# Patient Record
Sex: MALE | Race: WHITE | NOT HISPANIC OR LATINO | Employment: OTHER | RURAL
[De-identification: names, ages, dates, MRNs, and addresses within clinical notes are randomized per-mention and may not be internally consistent; named-entity substitution may affect disease eponyms.]

---

## 2017-01-03 ENCOUNTER — HOSPITAL ENCOUNTER (OUTPATIENT)
Dept: PHYSICAL THERAPY | Facility: HOSPITAL | Age: 42
Setting detail: RECURRING SERIES
Discharge: HOME | End: 2017-04-26
Attending: ORTHOPAEDIC SURGERY | Admitting: ORTHOPAEDIC SURGERY

## 2017-01-04 ENCOUNTER — CONVERSION ENCOUNTER (OUTPATIENT)
Dept: PAIN MEDICINE | Facility: CLINIC | Age: 42
End: 2017-01-04

## 2017-01-04 ENCOUNTER — HOSPITAL ENCOUNTER (OUTPATIENT)
Dept: ORTHOPEDIC SURGERY | Facility: CLINIC | Age: 42
Discharge: HOME | End: 2017-01-04
Attending: ORTHOPAEDIC SURGERY | Admitting: ORTHOPAEDIC SURGERY

## 2017-01-09 ENCOUNTER — CONVERSION ENCOUNTER (OUTPATIENT)
Dept: PAIN MEDICINE | Facility: CLINIC | Age: 42
End: 2017-01-09

## 2017-01-09 ENCOUNTER — HOSPITAL ENCOUNTER (OUTPATIENT)
Dept: PAIN MEDICINE | Facility: HOSPITAL | Age: 42
Discharge: HOME | End: 2017-01-09
Attending: ANESTHESIOLOGY | Admitting: ANESTHESIOLOGY

## 2017-01-11 ENCOUNTER — HOSPITAL ENCOUNTER (OUTPATIENT)
Dept: MRI IMAGING | Facility: HOSPITAL | Age: 42
Discharge: HOME | End: 2017-01-11
Attending: ANESTHESIOLOGY | Admitting: ANESTHESIOLOGY

## 2017-01-30 ENCOUNTER — HOSPITAL ENCOUNTER (OUTPATIENT)
Dept: PAIN MEDICINE | Facility: HOSPITAL | Age: 42
Discharge: HOME | End: 2017-01-30
Attending: ANESTHESIOLOGY | Admitting: ANESTHESIOLOGY

## 2017-01-30 ENCOUNTER — CONVERSION ENCOUNTER (OUTPATIENT)
Dept: PAIN MEDICINE | Facility: CLINIC | Age: 42
End: 2017-01-30

## 2017-02-14 ENCOUNTER — HOSPITAL ENCOUNTER (OUTPATIENT)
Dept: PAIN MEDICINE | Facility: HOSPITAL | Age: 42
Discharge: HOME | End: 2017-02-14
Attending: ANESTHESIOLOGY | Admitting: ANESTHESIOLOGY

## 2017-02-14 ENCOUNTER — CONVERSION ENCOUNTER (OUTPATIENT)
Dept: PAIN MEDICINE | Facility: CLINIC | Age: 42
End: 2017-02-14

## 2017-02-28 ENCOUNTER — CONVERSION ENCOUNTER (OUTPATIENT)
Dept: PAIN MEDICINE | Facility: CLINIC | Age: 42
End: 2017-02-28

## 2017-02-28 ENCOUNTER — HOSPITAL ENCOUNTER (OUTPATIENT)
Dept: PAIN MEDICINE | Facility: HOSPITAL | Age: 42
Discharge: HOME | End: 2017-02-28
Attending: ANESTHESIOLOGY | Admitting: ANESTHESIOLOGY

## 2017-05-09 ENCOUNTER — HOSPITAL ENCOUNTER (OUTPATIENT)
Dept: PAIN MEDICINE | Facility: HOSPITAL | Age: 42
Discharge: HOME | End: 2017-05-09
Attending: ANESTHESIOLOGY | Admitting: ANESTHESIOLOGY

## 2017-05-09 ENCOUNTER — CONVERSION ENCOUNTER (OUTPATIENT)
Dept: PAIN MEDICINE | Facility: CLINIC | Age: 42
End: 2017-05-09

## 2017-05-23 ENCOUNTER — CONVERSION ENCOUNTER (OUTPATIENT)
Dept: PAIN MEDICINE | Facility: CLINIC | Age: 42
End: 2017-05-23

## 2017-05-23 ENCOUNTER — HOSPITAL ENCOUNTER (OUTPATIENT)
Dept: PAIN MEDICINE | Facility: HOSPITAL | Age: 42
Discharge: HOME | End: 2017-05-23
Attending: ANESTHESIOLOGY | Admitting: ANESTHESIOLOGY

## 2017-06-13 ENCOUNTER — HOSPITAL ENCOUNTER (OUTPATIENT)
Dept: PAIN MEDICINE | Facility: HOSPITAL | Age: 42
Discharge: HOME | End: 2017-06-13
Attending: ANESTHESIOLOGY | Admitting: ANESTHESIOLOGY

## 2017-06-13 ENCOUNTER — CONVERSION ENCOUNTER (OUTPATIENT)
Dept: PAIN MEDICINE | Facility: CLINIC | Age: 42
End: 2017-06-13

## 2017-07-11 ENCOUNTER — HOSPITAL ENCOUNTER (OUTPATIENT)
Dept: PAIN MEDICINE | Facility: HOSPITAL | Age: 42
Discharge: HOME | End: 2017-07-11
Attending: ANESTHESIOLOGY | Admitting: ANESTHESIOLOGY

## 2017-07-11 ENCOUNTER — CONVERSION ENCOUNTER (OUTPATIENT)
Dept: PAIN MEDICINE | Facility: CLINIC | Age: 42
End: 2017-07-11

## 2017-07-17 ENCOUNTER — HOSPITAL ENCOUNTER (OUTPATIENT)
Dept: PHYSICAL THERAPY | Facility: HOSPITAL | Age: 42
Setting detail: RECURRING SERIES
Discharge: HOME | End: 2017-09-01
Attending: ANESTHESIOLOGY | Admitting: ANESTHESIOLOGY

## 2019-06-03 ENCOUNTER — CONVERSION ENCOUNTER (OUTPATIENT)
Dept: SURGERY | Facility: CLINIC | Age: 44
End: 2019-06-03

## 2019-06-04 VITALS
DIASTOLIC BLOOD PRESSURE: 78 MMHG | WEIGHT: 161 LBS | HEART RATE: 87 BPM | DIASTOLIC BLOOD PRESSURE: 86 MMHG | SYSTOLIC BLOOD PRESSURE: 129 MMHG | OXYGEN SATURATION: 98 % | OXYGEN SATURATION: 99 % | HEART RATE: 82 BPM | WEIGHT: 173 LBS | HEART RATE: 97 BPM | DIASTOLIC BLOOD PRESSURE: 86 MMHG | HEART RATE: 85 BPM | SYSTOLIC BLOOD PRESSURE: 121 MMHG | SYSTOLIC BLOOD PRESSURE: 127 MMHG | RESPIRATION RATE: 16 BRPM | WEIGHT: 173 LBS | OXYGEN SATURATION: 98 % | WEIGHT: 173 LBS | HEART RATE: 74 BPM | DIASTOLIC BLOOD PRESSURE: 84 MMHG | WEIGHT: 173 LBS | RESPIRATION RATE: 16 BRPM | SYSTOLIC BLOOD PRESSURE: 133 MMHG | OXYGEN SATURATION: 99 % | DIASTOLIC BLOOD PRESSURE: 77 MMHG | RESPIRATION RATE: 16 BRPM | DIASTOLIC BLOOD PRESSURE: 82 MMHG | RESPIRATION RATE: 16 BRPM | RESPIRATION RATE: 16 BRPM | RESPIRATION RATE: 16 BRPM | HEART RATE: 74 BPM | WEIGHT: 161 LBS | OXYGEN SATURATION: 96 % | HEART RATE: 127 BPM | HEART RATE: 81 BPM | RESPIRATION RATE: 16 BRPM | SYSTOLIC BLOOD PRESSURE: 136 MMHG | OXYGEN SATURATION: 98 % | RESPIRATION RATE: 16 BRPM | WEIGHT: 173 LBS | OXYGEN SATURATION: 100 % | DIASTOLIC BLOOD PRESSURE: 83 MMHG | SYSTOLIC BLOOD PRESSURE: 129 MMHG | WEIGHT: 173 LBS | SYSTOLIC BLOOD PRESSURE: 137 MMHG | DIASTOLIC BLOOD PRESSURE: 91 MMHG | SYSTOLIC BLOOD PRESSURE: 130 MMHG | OXYGEN SATURATION: 96 %

## 2019-06-04 VITALS
OXYGEN SATURATION: 99 % | HEIGHT: 66 IN | DIASTOLIC BLOOD PRESSURE: 92 MMHG | WEIGHT: 150.2 LBS | SYSTOLIC BLOOD PRESSURE: 151 MMHG | BODY MASS INDEX: 24.14 KG/M2 | HEART RATE: 88 BPM

## 2019-06-06 ENCOUNTER — HOSPITAL ENCOUNTER (OUTPATIENT)
Dept: CT IMAGING | Facility: HOSPITAL | Age: 44
Discharge: HOME | End: 2019-06-06
Attending: SURGERY | Admitting: SURGERY
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
## 2019-06-24 ENCOUNTER — OFFICE VISIT (OUTPATIENT)
Dept: SURGERY | Facility: CLINIC | Age: 44
End: 2019-06-24

## 2019-06-24 VITALS
BODY MASS INDEX: 23.53 KG/M2 | DIASTOLIC BLOOD PRESSURE: 77 MMHG | HEART RATE: 88 BPM | TEMPERATURE: 97.6 F | SYSTOLIC BLOOD PRESSURE: 129 MMHG | HEIGHT: 66 IN | OXYGEN SATURATION: 98 % | WEIGHT: 146.4 LBS

## 2019-06-24 DIAGNOSIS — K59.00 CONSTIPATION, UNSPECIFIED CONSTIPATION TYPE: Primary | ICD-10-CM

## 2019-06-24 DIAGNOSIS — R10.32 INGUINODYNIA, LEFT: ICD-10-CM

## 2019-06-24 PROBLEM — M54.12 CERVICAL RADICULOPATHY: Status: ACTIVE | Noted: 2019-06-24

## 2019-06-24 PROBLEM — M54.2 NECK PAIN: Status: ACTIVE | Noted: 2019-06-24

## 2019-06-24 PROBLEM — M54.50 LOW BACK PAIN SYNDROME: Status: ACTIVE | Noted: 2019-06-24

## 2019-06-24 PROBLEM — G43.909 MIGRAINE: Status: ACTIVE | Noted: 2019-06-03

## 2019-06-24 PROBLEM — K40.91 RECURRENT LEFT INGUINAL HERNIA: Status: ACTIVE | Noted: 2019-06-03

## 2019-06-24 PROBLEM — IMO0002 THORACIC OR LUMBOSACRAL NEURITIS OR RADICULITIS: Status: ACTIVE | Noted: 2019-06-24

## 2019-06-24 PROBLEM — M54.16 LUMBAR RADICULOPATHY: Status: ACTIVE | Noted: 2019-06-24

## 2019-06-24 PROCEDURE — 99213 OFFICE O/P EST LOW 20 MIN: CPT | Performed by: SURGERY

## 2019-06-24 RX ORDER — NAPROXEN 250 MG/1
TABLET ORAL
COMMUNITY
Start: 2016-01-13 | End: 2022-10-28

## 2019-06-24 RX ORDER — POLYETHYLENE GLYCOL 3350 17 G/17G
17 POWDER, FOR SOLUTION ORAL DAILY PRN
Qty: 30 PACKET | Refills: 0 | Status: SHIPPED | OUTPATIENT
Start: 2019-06-24 | End: 2022-10-28

## 2019-06-24 RX ORDER — BACLOFEN 10 MG/1
TABLET ORAL
Refills: 2 | COMMUNITY
Start: 2019-05-13 | End: 2022-10-28

## 2019-06-24 RX ORDER — GABAPENTIN 800 MG/1
800 TABLET ORAL 4 TIMES DAILY
Refills: 6 | COMMUNITY
Start: 2019-06-08

## 2019-06-24 RX ORDER — DIPHENHYDRAMINE HCL 25 MG
1 CAPSULE ORAL EVERY 24 HOURS
COMMUNITY
Start: 2019-06-03

## 2019-06-24 RX ORDER — BUPRENORPHINE HYDROCHLORIDE AND NALOXONE HYDROCHLORIDE DIHYDRATE 8; 2 MG/1; MG/1
TABLET SUBLINGUAL
Refills: 0 | COMMUNITY
Start: 2019-06-06 | End: 2022-10-28

## 2019-06-24 NOTE — PROGRESS NOTES
GENERAL SURGERY PROGRESS NOTE    6/24/2019  Patient Care Team:  Belen Patterson MD as PCP - General  Belen Patterson MD as PCP - Family Medicine    Subjective     Interval History:   Patient returns to my office today with worsening left inguinal pain.  He reports that the pain is constant, and is made worse with direct pressure.  He describes a burning and shooting pain which radiates down the front of the left leg and radiates to the base of the penis on the left side.  He also has shooting pains in the left hemiscrotum as well.  He reports that he has had difficulty urinating, most specifically initiating a stream.  Once he is able to initiate a stream, he has no difficulties urinating.  He also states that he has been constipated, and was taking Colace and Dulcolax along with an enema, but had minimal relief.    Objective     Vital Signs  Temp:  [97.6 °F (36.4 °C)] 97.6 °F (36.4 °C)  Heart Rate:  [88] 88  BP: (129)/(77) 129/77    Physical Exam  General: awake alert and oriented, no distress  HEENT: NCAT, EOMI, MMM  Lungs: CTAB  Heart: RRR  Abd: soft, NT, ND  : bilateral groins with surgical incisions well healed. Tussive impulse on lateral aspect of left groin incision. No inguinal hernia. +tenderness tgo palpation bilaterally     Results Review:    Lab Results (last 24 hours)     ** No results found for the last 24 hours. **        Imaging Results (last 24 hours)     ** No results found for the last 24 hours. **         CT scan of the pelvis performed on 6/6/2019  Impression:  1.  No evidence of recurrent hernia.  There is minor postoperative change in each inguinal area  2.  There are 2 mainly fat density oval structures one in the left lower pelvis and one in the right perirectal area.  These could be due to lipomas or fat necrosis of unknown etiology.  Right perirectal lesion does have some wispy soft tissue components.  Consider CT scan follow-up in 6 months to check for stability.  3.  Evidence of  prior lumbar surgery    I have reviewed the above results and noted them below    Medication Review:    Current Outpatient Medications:   •  Aspirin-Caffeine (NAHUM BACK & BODY) 500-32.5 MG tablet, NAHUM BACK & BODY 500-32.5 MG TABS, Disp: , Rfl:   •  baclofen (LIORESAL) 10 MG tablet, TAKE 1 TABLET BY MOUTH THREE TIMES DAILY AS NEEDED FOR SPASM, Disp: , Rfl: 2  •  buprenorphine-naloxone (SUBOXONE) 8-2 MG per SL tablet, DISSOLVE 2 TABLETS UNDER THE TONGUE DAILY FOR 27 DAYS, Disp: , Rfl: 0  •  diphenhydrAMINE (BENADRYL ALLERGY) 25 mg capsule, 1 capsule Daily., Disp: , Rfl:   •  gabapentin (NEURONTIN) 800 MG tablet, Take 800 mg by mouth 4 (Four) Times a Day., Disp: , Rfl: 6  •  MULTIPLE VITAMIN-FOLIC ACID PO, MULTIVITAMINS TABS, Disp: , Rfl:   •  naproxen (NAPROSYN) 250 MG tablet, NAPROXEN TABS, Disp: , Rfl:   •  polyethylene glycol (MIRALAX) packet, Take 17 g by mouth Daily As Needed (constipation)., Disp: 30 packet, Rfl: 0    Assessment/Plan     Active Problems:  Left inguinodynia, constipation      With regards to the patient's constipation: I recommend drinking between 64 and 100 ounces of water per day, also recommend MiraLAX once or twice daily as needed for constipation    With regards the patient's left inguinodynia: I recommend ibuprofen 800 mg 3 times a day in conjunction with ice and/or heat for discomfort.  On CT scan, the patient does not have an evident recurrent inguinal hernia.  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  He is already taking Neurontin, I would recommend the patient follow-up with pain management and be evaluated for possibly undergoing injections around the ileal inguinal nerve to assist with his pain.  I discussed that I do not see a clear cause for his left inguinal pain which can be fixed with surgery.  If his left inguinodynia is intractable, he may need sectioning of the nerve, and/or removal of the mesh, but I would only do this if he continues to fail  nonoperative management.    Plan for disposition: Follow-up with me in 3 to 6 months if inguinal pain is not improved.    Efrain Gomez MD  06/24/19  10:53 AM

## 2019-12-19 ENCOUNTER — HOSPITAL ENCOUNTER (EMERGENCY)
Facility: HOSPITAL | Age: 44
Discharge: HOME | End: 2019-12-20
Attending: EMERGENCY MEDICINE | Admitting: EMERGENCY MEDICINE

## 2019-12-19 DIAGNOSIS — S13.9XXA ACUTE SPRAIN OF LIGAMENT OF NECK, INITIAL ENCOUNTER: Primary | ICD-10-CM

## 2019-12-19 DIAGNOSIS — M54.2 NECK PAIN: ICD-10-CM
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
## 2019-12-19 DIAGNOSIS — M47.899 OTHER OSTEOARTHRITIS OF SPINE, UNSPECIFIED SPINAL REGION: ICD-10-CM

## 2019-12-19 DIAGNOSIS — S33.5XXA LUMBAR SPRAIN, INITIAL ENCOUNTER: ICD-10-CM

## 2019-12-19 DIAGNOSIS — M54.12 CERVICAL RADICULOPATHY: ICD-10-CM

## 2019-12-19 DIAGNOSIS — M54.16 LUMBAR RADICULOPATHY: ICD-10-CM

## 2019-12-19 PROCEDURE — 99284 EMERGENCY DEPT VISIT MOD MDM: CPT

## 2019-12-20 ENCOUNTER — APPOINTMENT (OUTPATIENT)
Dept: CT IMAGING | Facility: HOSPITAL | Age: 44
End: 2019-12-20

## 2019-12-20 VITALS
SYSTOLIC BLOOD PRESSURE: 106 MMHG | DIASTOLIC BLOOD PRESSURE: 83 MMHG | HEART RATE: 84 BPM | RESPIRATION RATE: 18 BRPM | TEMPERATURE: 98.3 F | WEIGHT: 145 LBS | BODY MASS INDEX: 22.76 KG/M2 | OXYGEN SATURATION: 100 % | HEIGHT: 67 IN

## 2019-12-20 PROCEDURE — 72131 CT LUMBAR SPINE W/O DYE: CPT

## 2019-12-20 PROCEDURE — 72125 CT NECK SPINE W/O DYE: CPT

## 2019-12-20 PROCEDURE — 25010000002 KETOROLAC TROMETHAMINE PER 15 MG: Performed by: EMERGENCY MEDICINE

## 2019-12-20 PROCEDURE — 96372 THER/PROPH/DIAG INJ SC/IM: CPT

## 2019-12-20 PROCEDURE — 70450 CT HEAD/BRAIN W/O DYE: CPT

## 2019-12-20 RX ORDER — KETOROLAC TROMETHAMINE 30 MG/ML
60 INJECTION, SOLUTION INTRAMUSCULAR; INTRAVENOUS ONCE
Status: COMPLETED | OUTPATIENT
Start: 2019-12-20 | End: 2019-12-20

## 2019-12-20 RX ORDER — KETOROLAC TROMETHAMINE 30 MG/ML
60 INJECTION, SOLUTION INTRAMUSCULAR; INTRAVENOUS ONCE
Status: DISCONTINUED | OUTPATIENT
Start: 2019-12-20 | End: 2019-12-20

## 2019-12-20 RX ORDER — TRAMADOL HYDROCHLORIDE 50 MG/1
50 TABLET ORAL EVERY 6 HOURS PRN
Qty: 12 TABLET | Refills: 0 | Status: SHIPPED | OUTPATIENT
Start: 2019-12-20 | End: 2022-10-28

## 2019-12-20 RX ADMIN — KETOROLAC TROMETHAMINE 60 MG: 30 INJECTION, SOLUTION INTRAMUSCULAR at 00:23

## 2019-12-20 NOTE — ED NOTES
Pt was cleared to be moved off back board and c-collar removed. Advised pt again that a urine sample is needed.      Cris Sinclair RN  12/20/19 0127

## 2019-12-20 NOTE — DISCHARGE INSTRUCTIONS
Continue your baclofen and gabapentin  Rest next 24 hours  No heavy lifting bending stooping or pulling for the next 3 days  Follow-up with your primary care provider
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
## 2021-08-23 ENCOUNTER — TRANSCRIBE ORDERS (OUTPATIENT)
Dept: ADMINISTRATIVE | Facility: HOSPITAL | Age: 46
End: 2021-08-23

## 2021-08-23 ENCOUNTER — LAB (OUTPATIENT)
Dept: LAB | Facility: HOSPITAL | Age: 46
End: 2021-08-23

## 2021-08-23 DIAGNOSIS — F11.20 OPIOID TYPE DEPENDENCE, CONTINUOUS (HCC): Primary | ICD-10-CM

## 2021-08-23 DIAGNOSIS — Z01.89 LABORATORY TEST: ICD-10-CM

## 2021-08-23 DIAGNOSIS — F11.20 LONG-TERM CURRENT USE OF METHADONE FOR OPIATE DEPENDENCE (HCC): ICD-10-CM

## 2021-08-23 DIAGNOSIS — F11.20 OPIOID TYPE DEPENDENCE, CONTINUOUS (HCC): ICD-10-CM

## 2021-08-23 LAB
ALBUMIN SERPL-MCNC: 3.7 G/DL (ref 3.5–5.2)
ALBUMIN/GLOB SERPL: 0.9 G/DL
ALP SERPL-CCNC: 95 U/L (ref 39–117)
ALT SERPL W P-5'-P-CCNC: 93 U/L (ref 1–41)
ANION GAP SERPL CALCULATED.3IONS-SCNC: 10.6 MMOL/L (ref 5–15)
AST SERPL-CCNC: 52 U/L (ref 1–40)
BASOPHILS # BLD AUTO: 0.08 10*3/MM3 (ref 0–0.2)
BASOPHILS NFR BLD AUTO: 0.9 % (ref 0–1.5)
BILIRUB SERPL-MCNC: 0.2 MG/DL (ref 0–1.2)
BUN SERPL-MCNC: 14 MG/DL (ref 6–20)
BUN/CREAT SERPL: 14.3 (ref 7–25)
CALCIUM SPEC-SCNC: 9.2 MG/DL (ref 8.6–10.5)
CHLORIDE SERPL-SCNC: 103 MMOL/L (ref 98–107)
CO2 SERPL-SCNC: 27.4 MMOL/L (ref 22–29)
CREAT SERPL-MCNC: 0.98 MG/DL (ref 0.76–1.27)
DEPRECATED RDW RBC AUTO: 43.9 FL (ref 37–54)
EOSINOPHIL # BLD AUTO: 0.15 10*3/MM3 (ref 0–0.4)
EOSINOPHIL NFR BLD AUTO: 1.6 % (ref 0.3–6.2)
ERYTHROCYTE [DISTWIDTH] IN BLOOD BY AUTOMATED COUNT: 12.4 % (ref 12.3–15.4)
GFR SERPL CREATININE-BSD FRML MDRD: 82 ML/MIN/1.73
GLOBULIN UR ELPH-MCNC: 3.9 GM/DL
GLUCOSE SERPL-MCNC: 91 MG/DL (ref 65–99)
HAV IGM SERPL QL IA: ABNORMAL
HBV CORE IGM SERPL QL IA: ABNORMAL
HBV SURFACE AG SERPL QL IA: ABNORMAL
HCT VFR BLD AUTO: 40.8 % (ref 37.5–51)
HCV AB SER DONR QL: REACTIVE
HGB BLD-MCNC: 13.4 G/DL (ref 13–17.7)
HIV1+2 AB SER QL: NORMAL
IMM GRANULOCYTES # BLD AUTO: 0.06 10*3/MM3 (ref 0–0.05)
IMM GRANULOCYTES NFR BLD AUTO: 0.6 % (ref 0–0.5)
LYMPHOCYTES # BLD AUTO: 2.18 10*3/MM3 (ref 0.7–3.1)
LYMPHOCYTES NFR BLD AUTO: 23.5 % (ref 19.6–45.3)
MCH RBC QN AUTO: 31.8 PG (ref 26.6–33)
MCHC RBC AUTO-ENTMCNC: 32.8 G/DL (ref 31.5–35.7)
MCV RBC AUTO: 96.9 FL (ref 79–97)
MONOCYTES # BLD AUTO: 1.02 10*3/MM3 (ref 0.1–0.9)
MONOCYTES NFR BLD AUTO: 11 % (ref 5–12)
NEUTROPHILS NFR BLD AUTO: 5.78 10*3/MM3 (ref 1.7–7)
NEUTROPHILS NFR BLD AUTO: 62.4 % (ref 42.7–76)
NRBC BLD AUTO-RTO: 0.1 /100 WBC (ref 0–0.2)
PLATELET # BLD AUTO: 272 10*3/MM3 (ref 140–450)
PMV BLD AUTO: 10 FL (ref 6–12)
POTASSIUM SERPL-SCNC: 3.9 MMOL/L (ref 3.5–5.2)
PROT SERPL-MCNC: 7.6 G/DL (ref 6–8.5)
RBC # BLD AUTO: 4.21 10*6/MM3 (ref 4.14–5.8)
SODIUM SERPL-SCNC: 141 MMOL/L (ref 136–145)
WBC # BLD AUTO: 9.27 10*3/MM3 (ref 3.4–10.8)

## 2021-08-23 PROCEDURE — 85025 COMPLETE CBC W/AUTO DIFF WBC: CPT

## 2021-08-23 PROCEDURE — 80074 ACUTE HEPATITIS PANEL: CPT

## 2021-08-23 PROCEDURE — G0432 EIA HIV-1/HIV-2 SCREEN: HCPCS

## 2021-08-23 PROCEDURE — 80053 COMPREHEN METABOLIC PANEL: CPT

## 2021-08-23 PROCEDURE — 36415 COLL VENOUS BLD VENIPUNCTURE: CPT

## 2022-03-21 ENCOUNTER — OFFICE (OUTPATIENT)
Dept: URBAN - METROPOLITAN AREA CLINIC 64 | Facility: CLINIC | Age: 47
End: 2022-03-21

## 2022-03-21 VITALS
HEART RATE: 89 BPM | HEIGHT: 66 IN | SYSTOLIC BLOOD PRESSURE: 160 MMHG | DIASTOLIC BLOOD PRESSURE: 91 MMHG | WEIGHT: 178 LBS

## 2022-03-21 DIAGNOSIS — B18.2 CHRONIC VIRAL HEPATITIS C: ICD-10-CM

## 2022-03-21 PROCEDURE — 99204 OFFICE O/P NEW MOD 45 MIN: CPT | Performed by: INTERNAL MEDICINE

## 2022-07-12 ENCOUNTER — APPOINTMENT (OUTPATIENT)
Dept: GENERAL RADIOLOGY | Facility: HOSPITAL | Age: 47
End: 2022-07-12

## 2022-07-12 ENCOUNTER — HOSPITAL ENCOUNTER (EMERGENCY)
Facility: HOSPITAL | Age: 47
Discharge: HOME | End: 2022-07-13
Attending: EMERGENCY MEDICINE | Admitting: EMERGENCY MEDICINE

## 2022-07-12 ENCOUNTER — APPOINTMENT (OUTPATIENT)
Dept: CT IMAGING | Facility: HOSPITAL | Age: 47
End: 2022-07-12

## 2022-07-12 VITALS
BODY MASS INDEX: 22.76 KG/M2 | HEIGHT: 67 IN | TEMPERATURE: 99.9 F | WEIGHT: 145 LBS | HEART RATE: 110 BPM | RESPIRATION RATE: 15 BRPM | DIASTOLIC BLOOD PRESSURE: 97 MMHG | OXYGEN SATURATION: 97 % | SYSTOLIC BLOOD PRESSURE: 145 MMHG

## 2022-07-12 DIAGNOSIS — R50.9 HYPERTHERMIA: ICD-10-CM

## 2022-07-12 DIAGNOSIS — F15.10 METHAMPHETAMINE ABUSE: Primary | ICD-10-CM

## 2022-07-12 LAB
ALBUMIN SERPL-MCNC: 4.7 G/DL (ref 3.5–5.2)
ALBUMIN/GLOB SERPL: 1.2 G/DL
ALP SERPL-CCNC: 128 U/L (ref 39–117)
ALT SERPL W P-5'-P-CCNC: 97 U/L (ref 1–41)
AMPHET+METHAMPHET UR QL: POSITIVE
ANION GAP SERPL CALCULATED.3IONS-SCNC: 12 MMOL/L (ref 5–15)
APTT PPP: 26.5 SECONDS (ref 61–76.5)
ARTERIAL PATENCY WRIST A: ABNORMAL
AST SERPL-CCNC: 66 U/L (ref 1–40)
ATMOSPHERIC PRESS: ABNORMAL MM[HG]
BARBITURATES UR QL SCN: POSITIVE
BASE EXCESS BLDA CALC-SCNC: -3.5 MMOL/L (ref 0–3)
BASOPHILS # BLD AUTO: 0.1 10*3/MM3 (ref 0–0.2)
BASOPHILS NFR BLD AUTO: 1 % (ref 0–1.5)
BDY SITE: ABNORMAL
BENZODIAZ UR QL SCN: NEGATIVE
BILIRUB SERPL-MCNC: 0.3 MG/DL (ref 0–1.2)
BUN SERPL-MCNC: 15 MG/DL (ref 6–20)
BUN/CREAT SERPL: 10.6 (ref 7–25)
CALCIUM SPEC-SCNC: 9.6 MG/DL (ref 8.6–10.5)
CANNABINOIDS SERPL QL: NEGATIVE
CHLORIDE SERPL-SCNC: 102 MMOL/L (ref 98–107)
CK SERPL-CCNC: 67 U/L (ref 20–200)
CO2 BLDA-SCNC: 26.1 MMOL/L (ref 22–29)
CO2 SERPL-SCNC: 26 MMOL/L (ref 22–29)
COCAINE UR QL: NEGATIVE
CREAT SERPL-MCNC: 1.42 MG/DL (ref 0.76–1.27)
D DIMER PPP FEU-MCNC: 0.53 MG/L (FEU) (ref 0–0.59)
DEPRECATED RDW RBC AUTO: 47.7 FL (ref 37–54)
EGFRCR SERPLBLD CKD-EPI 2021: 61 ML/MIN/1.73
EOSINOPHIL # BLD AUTO: 0 10*3/MM3 (ref 0–0.4)
EOSINOPHIL NFR BLD AUTO: 0.4 % (ref 0.3–6.2)
ERYTHROCYTE [DISTWIDTH] IN BLOOD BY AUTOMATED COUNT: 14.4 % (ref 12.3–15.4)
ETHANOL UR QL: <0.01 %
GLOBULIN UR ELPH-MCNC: 4 GM/DL
GLUCOSE BLDC GLUCOMTR-MCNC: 161 MG/DL (ref 70–105)
GLUCOSE SERPL-MCNC: 145 MG/DL (ref 65–99)
HCO3 BLDA-SCNC: 24.4 MMOL/L (ref 21–28)
HCT VFR BLD AUTO: 44.9 % (ref 37.5–51)
HEMODILUTION: NO
HGB BLD-MCNC: 14.6 G/DL (ref 13–17.7)
HOLD SPECIMEN: NORMAL
HOLD SPECIMEN: NORMAL
INHALED O2 CONCENTRATION: 21 %
INR PPP: 1.07 (ref 0.93–1.1)
LYMPHOCYTES # BLD AUTO: 2.3 10*3/MM3 (ref 0.7–3.1)
LYMPHOCYTES NFR BLD AUTO: 22.5 % (ref 19.6–45.3)
MAGNESIUM SERPL-MCNC: 2.5 MG/DL (ref 1.6–2.6)
MCH RBC QN AUTO: 31.2 PG (ref 26.6–33)
MCHC RBC AUTO-ENTMCNC: 32.6 G/DL (ref 31.5–35.7)
MCV RBC AUTO: 95.7 FL (ref 79–97)
METHADONE UR QL SCN: NEGATIVE
MODALITY: ABNORMAL
MONOCYTES # BLD AUTO: 0.7 10*3/MM3 (ref 0.1–0.9)
MONOCYTES NFR BLD AUTO: 6.5 % (ref 5–12)
NEUTROPHILS NFR BLD AUTO: 69.6 % (ref 42.7–76)
NEUTROPHILS NFR BLD AUTO: 7.2 10*3/MM3 (ref 1.7–7)
NRBC BLD AUTO-RTO: 0 /100 WBC (ref 0–0.2)
NT-PROBNP SERPL-MCNC: 272.3 PG/ML (ref 0–450)
OPIATES UR QL: NEGATIVE
OXYCODONE UR QL SCN: NEGATIVE
PCO2 BLDA: 55.4 MM HG (ref 35–48)
PH BLDA: 7.25 PH UNITS (ref 7.35–7.45)
PLATELET # BLD AUTO: 306 10*3/MM3 (ref 140–450)
PMV BLD AUTO: 8.2 FL (ref 6–12)
PO2 BLDA: 65.5 MM HG (ref 83–108)
POTASSIUM SERPL-SCNC: 4.3 MMOL/L (ref 3.5–5.2)
PROT SERPL-MCNC: 8.7 G/DL (ref 6–8.5)
PROTHROMBIN TIME: 11 SECONDS (ref 9.6–11.7)
RBC # BLD AUTO: 4.69 10*6/MM3 (ref 4.14–5.8)
SAO2 % BLDCOA: 88.6 % (ref 94–98)
SARS-COV-2 RNA PNL SPEC NAA+PROBE: NOT DETECTED
SODIUM SERPL-SCNC: 140 MMOL/L (ref 136–145)
TROPONIN T SERPL-MCNC: <0.01 NG/ML (ref 0–0.03)
TSH SERPL DL<=0.05 MIU/L-ACNC: 0.64 UIU/ML (ref 0.27–4.2)
WBC NRBC COR # BLD: 10.3 10*3/MM3 (ref 3.4–10.8)
WHOLE BLOOD HOLD COAG: NORMAL
WHOLE BLOOD HOLD SPECIMEN: NORMAL

## 2022-07-12 PROCEDURE — 93005 ELECTROCARDIOGRAM TRACING: CPT | Performed by: EMERGENCY MEDICINE

## 2022-07-12 PROCEDURE — 82077 ASSAY SPEC XCP UR&BREATH IA: CPT | Performed by: EMERGENCY MEDICINE

## 2022-07-12 PROCEDURE — 80053 COMPREHEN METABOLIC PANEL: CPT | Performed by: EMERGENCY MEDICINE

## 2022-07-12 PROCEDURE — 85610 PROTHROMBIN TIME: CPT | Performed by: EMERGENCY MEDICINE

## 2022-07-12 PROCEDURE — 85730 THROMBOPLASTIN TIME PARTIAL: CPT | Performed by: EMERGENCY MEDICINE

## 2022-07-12 PROCEDURE — 70450 CT HEAD/BRAIN W/O DYE: CPT

## 2022-07-12 PROCEDURE — 84484 ASSAY OF TROPONIN QUANT: CPT | Performed by: EMERGENCY MEDICINE

## 2022-07-12 PROCEDURE — P9612 CATHETERIZE FOR URINE SPEC: HCPCS

## 2022-07-12 PROCEDURE — 80307 DRUG TEST PRSMV CHEM ANLYZR: CPT | Performed by: EMERGENCY MEDICINE

## 2022-07-12 PROCEDURE — 82550 ASSAY OF CK (CPK): CPT | Performed by: EMERGENCY MEDICINE

## 2022-07-12 PROCEDURE — 36600 WITHDRAWAL OF ARTERIAL BLOOD: CPT

## 2022-07-12 PROCEDURE — 99284 EMERGENCY DEPT VISIT MOD MDM: CPT

## 2022-07-12 PROCEDURE — 84443 ASSAY THYROID STIM HORMONE: CPT | Performed by: EMERGENCY MEDICINE

## 2022-07-12 PROCEDURE — 36415 COLL VENOUS BLD VENIPUNCTURE: CPT

## 2022-07-12 PROCEDURE — 94799 UNLISTED PULMONARY SVC/PX: CPT

## 2022-07-12 PROCEDURE — 87147 CULTURE TYPE IMMUNOLOGIC: CPT | Performed by: EMERGENCY MEDICINE

## 2022-07-12 PROCEDURE — 71045 X-RAY EXAM CHEST 1 VIEW: CPT

## 2022-07-12 PROCEDURE — 82962 GLUCOSE BLOOD TEST: CPT

## 2022-07-12 PROCEDURE — 83735 ASSAY OF MAGNESIUM: CPT | Performed by: EMERGENCY MEDICINE

## 2022-07-12 PROCEDURE — 83880 ASSAY OF NATRIURETIC PEPTIDE: CPT | Performed by: EMERGENCY MEDICINE

## 2022-07-12 PROCEDURE — 85379 FIBRIN DEGRADATION QUANT: CPT | Performed by: EMERGENCY MEDICINE

## 2022-07-12 PROCEDURE — 87150 DNA/RNA AMPLIFIED PROBE: CPT | Performed by: EMERGENCY MEDICINE

## 2022-07-12 PROCEDURE — 82803 BLOOD GASES ANY COMBINATION: CPT

## 2022-07-12 PROCEDURE — 85025 COMPLETE CBC W/AUTO DIFF WBC: CPT | Performed by: EMERGENCY MEDICINE

## 2022-07-12 PROCEDURE — 87635 SARS-COV-2 COVID-19 AMP PRB: CPT | Performed by: EMERGENCY MEDICINE

## 2022-07-12 PROCEDURE — 87040 BLOOD CULTURE FOR BACTERIA: CPT | Performed by: EMERGENCY MEDICINE

## 2022-07-12 PROCEDURE — 94640 AIRWAY INHALATION TREATMENT: CPT

## 2022-07-12 RX ORDER — SODIUM CHLORIDE 0.9 % (FLUSH) 0.9 %
10 SYRINGE (ML) INJECTION AS NEEDED
Status: DISCONTINUED | OUTPATIENT
Start: 2022-07-12 | End: 2022-07-13 | Stop reason: HOSPADM

## 2022-07-12 RX ORDER — ALBUTEROL SULFATE 2.5 MG/3ML
2.5 SOLUTION RESPIRATORY (INHALATION) ONCE
Status: DISCONTINUED | OUTPATIENT
Start: 2022-07-12 | End: 2022-07-13 | Stop reason: HOSPADM

## 2022-07-12 RX ORDER — ALBUTEROL SULFATE 90 UG/1
2 AEROSOL, METERED RESPIRATORY (INHALATION) ONCE
Status: COMPLETED | OUTPATIENT
Start: 2022-07-12 | End: 2022-07-12

## 2022-07-12 RX ADMIN — ALBUTEROL SULFATE 2 PUFF: 108 INHALANT RESPIRATORY (INHALATION) at 20:50

## 2022-07-12 RX ADMIN — SODIUM CHLORIDE 1000 ML: 9 INJECTION, SOLUTION INTRAVENOUS at 20:26

## 2022-07-13 NOTE — DISCHARGE INSTRUCTIONS
Rest, drink plenty of fluids, follow-up with your substance abuse counselors this week.  Avoid the heat.  Follow-up with your doctor for ongoing management of the hepatitis C as well as recheck of renal insufficiency.
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
## 2022-07-14 LAB
BACTERIA BLD CULT: ABNORMAL
BACTERIA SPEC AEROBE CULT: ABNORMAL
BOTTLE TYPE: ABNORMAL
GRAM STN SPEC: ABNORMAL
ISOLATED FROM: ABNORMAL
QT INTERVAL: 304 MS

## 2022-07-15 NOTE — PHARMACY RECOMMENDATION
The results of this culture are considered probable contamination. No treatment recommended for the results of this culture. No further follow-up required.    Microbiology Results (last 10 days)       Procedure Component Value - Date/Time    Blood Culture - Blood, Arm, Right [[id number]]  (Normal) Collected: 07/12/22 2104    Lab Status: Preliminary result Specimen: Blood from Arm, Right Updated: 07/14/22 2117     Blood Culture No growth at 2 days    Blood Culture - Blood, Arm, Left [[id number]]  (Abnormal) Collected: 07/12/22 2100    Lab Status: Final result Specimen: Blood from Arm, Left Updated: 07/14/22 0732     Blood Culture Staphylococcus, coagulase negative     Isolated from Aerobic Bottle     Gram Stain Aerobic Bottle Gram positive cocci in clusters    Narrative:      Probable contaminant requires clinical correlation, susceptibility not performed unless requested by physician.      Blood Culture ID, PCR - Blood, Arm, Left [[id number]]  (Abnormal) Collected: 07/12/22 2100    Lab Status: Final result Specimen: Blood from Arm, Left Updated: 07/14/22 0615     BCID, PCR Staph spp, not aureus or lugdunesis. Identification by BCID2 PCR.     BOTTLE TYPE Aerobic Bottle    COVID PRE-OP / PRE-PROCEDURE SCREENING ORDER (NO ISOLATION) - Swab, Nasopharynx [[id number]]  (Normal) Collected: 07/12/22 2026    Lab Status: Final result Specimen: Swab from Nasopharynx Updated: 07/12/22 2048    Narrative:      The following orders were created for panel order COVID PRE-OP / PRE-PROCEDURE SCREENING ORDER (NO ISOLATION) - Swab, Nasopharynx.  Procedure                               Abnormality         Status                     ---------                               -----------         ------                     COVID-19,CEPHEID/RUBY,CO...[[id number]]  Normal              Final result                 Please view results for these tests on the individual orders.    COVID-19,CEPHEID/RUBY,COR/RICARDO/PAD/SHAKA IN-HOUSE(OR EMERGENT/ADD-ON),NP  SWAB IN TRANSPORT MEDIA 3-4 HR TAT, RT-PCR - Swab, Nasopharynx [[id number]]  (Normal) Collected: 07/12/22 2026    Lab Status: Final result Specimen: Swab from Nasopharynx Updated: 07/12/22 2048     COVID19 Not Detected    Narrative:      Fact sheet for providers: https://www.fda.gov/media/068160/download     Fact sheet for patients: https://www.fda.gov/media/122405/download  Fact sheet for providers: https://www.fda.gov/media/001703/download    Fact sheet for patients: https://www.fda.gov/media/392966/download    Test performed by PCR.            Alison Chance, PharmD  7/15/2022 16:22 EDT

## 2022-07-17 LAB — BACTERIA SPEC AEROBE CULT: NORMAL

## 2022-10-27 ENCOUNTER — HOSPITAL ENCOUNTER (EMERGENCY)
Facility: HOSPITAL | Age: 47
Discharge: HOME | End: 2022-10-27
Attending: EMERGENCY MEDICINE | Admitting: EMERGENCY MEDICINE

## 2022-10-27 VITALS
SYSTOLIC BLOOD PRESSURE: 156 MMHG | DIASTOLIC BLOOD PRESSURE: 95 MMHG | BODY MASS INDEX: 24.17 KG/M2 | HEIGHT: 67 IN | HEART RATE: 91 BPM | TEMPERATURE: 98.9 F | OXYGEN SATURATION: 98 % | RESPIRATION RATE: 17 BRPM | WEIGHT: 154 LBS

## 2022-10-27 DIAGNOSIS — M54.50 LOW BACK PAIN, UNSPECIFIED BACK PAIN LATERALITY, UNSPECIFIED CHRONICITY, UNSPECIFIED WHETHER SCIATICA PRESENT: Primary | ICD-10-CM

## 2022-10-27 PROCEDURE — 63710000001 ONDANSETRON ODT 4 MG TABLET DISPERSIBLE: Performed by: EMERGENCY MEDICINE

## 2022-10-27 PROCEDURE — 96372 THER/PROPH/DIAG INJ SC/IM: CPT

## 2022-10-27 PROCEDURE — 99284 EMERGENCY DEPT VISIT MOD MDM: CPT

## 2022-10-27 PROCEDURE — 25010000002 HYDROMORPHONE 1 MG/ML SOLUTION: Performed by: EMERGENCY MEDICINE

## 2022-10-27 RX ORDER — ONDANSETRON 4 MG/1
4 TABLET, ORALLY DISINTEGRATING ORAL ONCE
Status: COMPLETED | OUTPATIENT
Start: 2022-10-27 | End: 2022-10-27

## 2022-10-27 RX ADMIN — HYDROMORPHONE HYDROCHLORIDE 1 MG: 1 INJECTION, SOLUTION INTRAMUSCULAR; INTRAVENOUS; SUBCUTANEOUS at 19:00

## 2022-10-27 RX ADMIN — ONDANSETRON 4 MG: 4 TABLET, ORALLY DISINTEGRATING ORAL at 19:00

## 2022-10-27 NOTE — PROGRESS NOTES
"Subjective   History of Present Illness: Howard Monet is a 47 y.o. male is being seen for consultation today at the request of Self Referring for acute right-sided back pain and  leg pain.  Patient has previous history of lumbar surgery with fusion from L4-S1.  He developed infection and underwent lumbar decompression with removal of lumbar hardware.  He then underwent an I&D of his lumbar wound.    Today patient reports that he fell last Tuesday night and has developed  severe right thigh pain that radiates down to his knee along with right groin pain.  He reports right leg weakness and is ambulating with a cane.  His pain is much worse when he is up walking limiting his ability to ambulate and better if he lays completely still.  He presented to hospital in [place] on 10/21/2022 with his pain, obtained x-rays and received narcotic pain medication with no betterment.  He reports his PCP has placed him on Flexeril that has not helped either.  He reports no bowel/bladder dysfunction, or saddle anesthesia.  He reports no fever, chills, general malaise, open wound.         History of Present Illness    The following portions of the patient's history were reviewed and updated as appropriate: allergies, current medications, past family history, past medical history, past social history, past surgical history and problem list.    Review of Systems   Musculoskeletal: Positive for back pain.   Neurological: Positive for numbness.   All other systems reviewed and are negative.      Objective     ./84   Pulse 114   Ht 170.2 cm (67\")   Wt 67 kg (147 lb 12.8 oz)   SpO2 100%   BMI 23.15 kg/m²    Body mass index is 23.15 kg/m².      Physical Exam  Neurologic Exam  4-/5 right hip flexion, 4+/5 right dorsiflexion  0+ patellar reflex right  Inability to perform straight leg raise test secondary to pain  Well-healed posterior midline incisional scar with no drainage, redness or swelling    Assessment & Plan   Independent " Review of Radiographic Studies:      I personally reviewed the following studies.    Lumbar x-ray 10/22/2022    No acute findings, no significant subluxation.  Moderate amount of facet arthropathy at L4-L5 and L5-S1.    CT lumbar spine 2019    Remote screw tracks seen bilaterally at L4 S1.  Evidence of osseous fusion posteriorly from L5-S1 with no obvious osseous incorporation along the facets at L4-L5.    Medical Decision Making:      Howard Rasmussen a 47 y.o. male with history of osteopenia, methamphetamine abuse, prior lumbar fusion with subsequent removal of hardware secondary to infection in 2016 that presents is presenting to clinic as a new patient for 1-1/2-week history of acute right-sided lumbar radiculopathy likely along the L3-L4 level.  He did have some right weakness along the hip flexion and dorsiflexion maneuvers I am uncertain the degree of weakness as patient was significantly painful on maneuvers.   He reported no cauda equina symptoms.  I was able to review imaging in 2019 demonstrating remote screw hole tracks from L4-S1 with osseous fusion of the facets from L5-S1 and no clear evidence of osseous fusion from L4-L5.  He does have quite a bit of osteopenia as well.  I do believe that additional imaging is warranted for further evaluation based on patient's presentation and assessment.  I recommend steroid injection in the office today followed by a Medrol Dosepak that he can start tomorrow.  I would like to place him on a course of muscle relaxers and he should continue with over-the-counter NSAID therapy.  I have also ordered formal course of physical therapy that patient may not be able to begin immediately due to his pain level.  I told the patient that if he began to have worsening pain symptoms or bowel/bladder dysfunction or saddle anesthesia that he should report to the ER.  He verbalized understanding and he said he would.  I encouraged him to call the office with any questions or  concerns and that we would follow-up once imaging was completed for further recommendations.      Diagnoses and all orders for this visit:    1. Acute lumbar radiculopathy (Primary)  -     MRI Lumbar Spine With & Without Contrast; Future  -     Ambulatory Referral to Physical Therapy Evaluate and treat  -     XR Spine Lumbar Complete With Flex & Ext; Future  -     methylPREDNISolone acetate (DEPO-medrol) injection 80 mg    Other orders  -     Discontinue: methylPREDNISolone (MEDROL) 4 MG dose pack; Take as directed on package instructions.  Dispense: 1 each; Refill: 0  -     Discontinue: methocarbamol (ROBAXIN) 750 MG tablet; Take 1 tablet by mouth 3 (Three) Times a Day As Needed for Muscle Spasms.  Dispense: 90 tablet; Refill: 0  -     methylPREDNISolone (MEDROL) 4 MG dose pack; Take as directed on package instructions.  Dispense: 1 each; Refill: 0  -     methocarbamol (ROBAXIN) 750 MG tablet; Take 1 tablet by mouth 4 (Four) Times a Day As Needed for Muscle Spasms.  Dispense: 120 tablet; Refill: 0      Return for Recheck.    This patient was examined wearing appropriate personal protective equipment.     Howard Tierney  reports that he has been smoking cigarettes. He has a 10.00 pack-year smoking history. He uses smokeless tobacco.. I have educated him on the risk of diseases from using tobacco products such as cancer, COPD and heart disease.     I advised him to quit and he is not willing to quit.    I spent 3  minutes counseling the patient.         Body mass index is 23.15 kg/m².  BMI is within normal parameters. No other follow-up for BMI required.      Patient's blood pressure was reviewed.  Recommendations for  a low-salt diet and exercise to maintain/improve BP in addition to taking any presribed medications.           Niurka Jackson, APRN  10/28/22  15:33 EDT

## 2022-10-28 ENCOUNTER — OFFICE VISIT (OUTPATIENT)
Dept: NEUROSURGERY | Facility: CLINIC | Age: 47
End: 2022-10-28

## 2022-10-28 VITALS
DIASTOLIC BLOOD PRESSURE: 84 MMHG | OXYGEN SATURATION: 100 % | BODY MASS INDEX: 23.2 KG/M2 | HEART RATE: 114 BPM | SYSTOLIC BLOOD PRESSURE: 149 MMHG | HEIGHT: 67 IN | WEIGHT: 147.8 LBS

## 2022-10-28 DIAGNOSIS — M54.16 ACUTE LUMBAR RADICULOPATHY: Primary | ICD-10-CM

## 2022-10-28 PROCEDURE — 99204 OFFICE O/P NEW MOD 45 MIN: CPT | Performed by: NURSE PRACTITIONER

## 2022-10-28 PROCEDURE — 96372 THER/PROPH/DIAG INJ SC/IM: CPT | Performed by: NURSE PRACTITIONER

## 2022-10-28 RX ORDER — METHOCARBAMOL 750 MG/1
750 TABLET, FILM COATED ORAL 4 TIMES DAILY PRN
Qty: 120 TABLET | Refills: 0 | Status: SHIPPED | OUTPATIENT
Start: 2022-10-28

## 2022-10-28 RX ORDER — METHOCARBAMOL 750 MG/1
750 TABLET, FILM COATED ORAL 3 TIMES DAILY PRN
Qty: 90 TABLET | Refills: 0 | Status: SHIPPED | OUTPATIENT
Start: 2022-10-28 | End: 2022-10-28

## 2022-10-28 RX ORDER — METHYLPREDNISOLONE 4 MG/1
TABLET ORAL
Qty: 1 EACH | Refills: 0 | Status: SHIPPED | OUTPATIENT
Start: 2022-10-28 | End: 2022-10-28

## 2022-10-28 RX ORDER — METHYLPREDNISOLONE ACETATE 80 MG/ML
80 INJECTION, SUSPENSION INTRA-ARTICULAR; INTRALESIONAL; INTRAMUSCULAR; SOFT TISSUE ONCE
Status: COMPLETED | OUTPATIENT
Start: 2022-10-28 | End: 2022-10-28

## 2022-10-28 RX ORDER — IBUPROFEN 200 MG
200 TABLET ORAL EVERY 6 HOURS PRN
COMMUNITY

## 2022-10-28 RX ORDER — METHYLPREDNISOLONE 4 MG/1
TABLET ORAL
Qty: 1 EACH | Refills: 0 | Status: SHIPPED | OUTPATIENT
Start: 2022-10-28

## 2022-10-28 RX ADMIN — METHYLPREDNISOLONE ACETATE 80 MG: 80 INJECTION, SUSPENSION INTRA-ARTICULAR; INTRALESIONAL; INTRAMUSCULAR; SOFT TISSUE at 15:05

## 2022-11-07 ENCOUNTER — TELEPHONE (OUTPATIENT)
Dept: NEUROSURGERY | Facility: CLINIC | Age: 47
End: 2022-11-07

## 2022-11-07 DIAGNOSIS — F40.240 CLAUSTROPHOBIA: Primary | ICD-10-CM

## 2022-11-07 NOTE — TELEPHONE ENCOUNTER
Caller: Howard Monet    Relationship to patient: Self    Best call back number: [phone]    Patient is needing: PATIENT CALLED AND HAS GOTTEN MRI SCHEDULED FOR 12/5, WHILE SCHEDULING THEY ASKED IF PATIENT WAS CLAUSTROPHOBIC, AND SINCE PATIENT ANSWERED YES, HE WAS ADVISED TO CALL [facility] TO POSSIBLY CALL IN SOMETHING    PLEASE CALL PATIENT TO ADVISE

## 2022-11-08 RX ORDER — DIAZEPAM 5 MG/1
TABLET ORAL
Qty: 2 TABLET | Refills: 0 | Status: SHIPPED | OUTPATIENT
Start: 2022-11-08

## 2022-11-08 NOTE — TELEPHONE ENCOUNTER
Called the patient however there was no answer. His mailbox is full and cannot accept any more at this time. If patient calls back, we sent him in Valium to the pharmacy. He will need someone to drive him to and from the MRI given the sedating effects.

## 2022-11-14 ENCOUNTER — APPOINTMENT (OUTPATIENT)
Dept: CT IMAGING | Facility: HOSPITAL | Age: 47
End: 2022-11-14

## 2022-11-14 ENCOUNTER — APPOINTMENT (OUTPATIENT)
Dept: GENERAL RADIOLOGY | Facility: HOSPITAL | Age: 47
End: 2022-11-14

## 2022-11-14 ENCOUNTER — HOSPITAL ENCOUNTER (EMERGENCY)
Facility: HOSPITAL | Age: 47
Discharge: HOME | End: 2022-11-14
Attending: EMERGENCY MEDICINE | Admitting: EMERGENCY MEDICINE

## 2022-11-14 VITALS
TEMPERATURE: 98.7 F | RESPIRATION RATE: 16 BRPM | HEART RATE: 103 BPM | WEIGHT: 147 LBS | HEIGHT: 67 IN | SYSTOLIC BLOOD PRESSURE: 140 MMHG | DIASTOLIC BLOOD PRESSURE: 81 MMHG | BODY MASS INDEX: 23.07 KG/M2 | OXYGEN SATURATION: 100 %

## 2022-11-14 DIAGNOSIS — R55 SYNCOPE, UNSPECIFIED SYNCOPE TYPE: Primary | ICD-10-CM

## 2022-11-14 LAB
ALBUMIN SERPL-MCNC: 4 G/DL (ref 3.5–5.2)
ALBUMIN/GLOB SERPL: 1.3 G/DL
ALP SERPL-CCNC: 90 U/L (ref 39–117)
ALT SERPL W P-5'-P-CCNC: 57 U/L (ref 1–41)
ANION GAP SERPL CALCULATED.3IONS-SCNC: 14 MMOL/L (ref 5–15)
AST SERPL-CCNC: 48 U/L (ref 1–40)
BASOPHILS # BLD AUTO: 0.1 10*3/MM3 (ref 0–0.2)
BASOPHILS NFR BLD AUTO: 0.7 % (ref 0–1.5)
BILIRUB SERPL-MCNC: 0.2 MG/DL (ref 0–1.2)
BUN SERPL-MCNC: 16 MG/DL (ref 6–20)
BUN/CREAT SERPL: 14.8 (ref 7–25)
CALCIUM SPEC-SCNC: 9 MG/DL (ref 8.6–10.5)
CHLORIDE SERPL-SCNC: 101 MMOL/L (ref 98–107)
CO2 SERPL-SCNC: 23 MMOL/L (ref 22–29)
CREAT SERPL-MCNC: 1.08 MG/DL (ref 0.76–1.27)
D DIMER PPP FEU-MCNC: 1.95 MG/L (FEU) (ref 0–0.59)
DEPRECATED RDW RBC AUTO: 44.6 FL (ref 37–54)
EGFRCR SERPLBLD CKD-EPI 2021: 84.6 ML/MIN/1.73
EOSINOPHIL # BLD AUTO: 0.1 10*3/MM3 (ref 0–0.4)
EOSINOPHIL NFR BLD AUTO: 0.7 % (ref 0.3–6.2)
ERYTHROCYTE [DISTWIDTH] IN BLOOD BY AUTOMATED COUNT: 13.3 % (ref 12.3–15.4)
GLOBULIN UR ELPH-MCNC: 3.1 GM/DL
GLUCOSE SERPL-MCNC: 161 MG/DL (ref 65–99)
HCT VFR BLD AUTO: 40.7 % (ref 37.5–51)
HGB BLD-MCNC: 13.3 G/DL (ref 13–17.7)
LYMPHOCYTES # BLD AUTO: 2.7 10*3/MM3 (ref 0.7–3.1)
LYMPHOCYTES NFR BLD AUTO: 20.1 % (ref 19.6–45.3)
MCH RBC QN AUTO: 32.1 PG (ref 26.6–33)
MCHC RBC AUTO-ENTMCNC: 32.7 G/DL (ref 31.5–35.7)
MCV RBC AUTO: 98 FL (ref 79–97)
MONOCYTES # BLD AUTO: 0.8 10*3/MM3 (ref 0.1–0.9)
MONOCYTES NFR BLD AUTO: 6.1 % (ref 5–12)
NEUTROPHILS NFR BLD AUTO: 72.4 % (ref 42.7–76)
NEUTROPHILS NFR BLD AUTO: 9.8 10*3/MM3 (ref 1.7–7)
NRBC BLD AUTO-RTO: 0 /100 WBC (ref 0–0.2)
PLATELET # BLD AUTO: 265 10*3/MM3 (ref 140–450)
PMV BLD AUTO: 8.4 FL (ref 6–12)
POTASSIUM SERPL-SCNC: 3.9 MMOL/L (ref 3.5–5.2)
PROT SERPL-MCNC: 7.1 G/DL (ref 6–8.5)
RBC # BLD AUTO: 4.16 10*6/MM3 (ref 4.14–5.8)
SODIUM SERPL-SCNC: 138 MMOL/L (ref 136–145)
TROPONIN T SERPL-MCNC: 0.03 NG/ML (ref 0–0.03)
WBC NRBC COR # BLD: 13.5 10*3/MM3 (ref 3.4–10.8)

## 2022-11-14 PROCEDURE — 85025 COMPLETE CBC W/AUTO DIFF WBC: CPT | Performed by: EMERGENCY MEDICINE

## 2022-11-14 PROCEDURE — 0 IOPAMIDOL PER 1 ML: Performed by: EMERGENCY MEDICINE

## 2022-11-14 PROCEDURE — 85379 FIBRIN DEGRADATION QUANT: CPT | Performed by: EMERGENCY MEDICINE

## 2022-11-14 PROCEDURE — 71275 CT ANGIOGRAPHY CHEST: CPT

## 2022-11-14 PROCEDURE — 93005 ELECTROCARDIOGRAM TRACING: CPT | Performed by: EMERGENCY MEDICINE

## 2022-11-14 PROCEDURE — 84484 ASSAY OF TROPONIN QUANT: CPT | Performed by: EMERGENCY MEDICINE

## 2022-11-14 PROCEDURE — 71045 X-RAY EXAM CHEST 1 VIEW: CPT

## 2022-11-14 PROCEDURE — 80053 COMPREHEN METABOLIC PANEL: CPT | Performed by: EMERGENCY MEDICINE

## 2022-11-14 PROCEDURE — 99284 EMERGENCY DEPT VISIT MOD MDM: CPT

## 2022-11-14 PROCEDURE — 70450 CT HEAD/BRAIN W/O DYE: CPT

## 2022-11-14 RX ORDER — SODIUM CHLORIDE 0.9 % (FLUSH) 0.9 %
10 SYRINGE (ML) INJECTION AS NEEDED
Status: DISCONTINUED | OUTPATIENT
Start: 2022-11-14 | End: 2022-11-14 | Stop reason: HOSPADM

## 2022-11-14 RX ADMIN — IOPAMIDOL 100 ML: 755 INJECTION, SOLUTION INTRAVENOUS at 18:01

## 2022-11-14 RX ADMIN — SODIUM CHLORIDE 500 ML: 9 INJECTION, SOLUTION INTRAVENOUS at 14:39

## 2022-11-14 NOTE — ED NOTES
Pt sitting at edge of bed when RN entered room. Pt holding a urinal to crotch with clear liquid in it. Pt reported he urinated and then washed it out a little with water after going. Pt reported he wasn't sure why he was here and 'didn't feel sick'. RN asked pt if he remembered being in the bushes as EMS reported they found him there. Pt reported he remembered trying to find a place to go to the bathroom at [facility] and was going in the bush. Pt reports he was discharged from [facility] earlier today for back surgery. Pt reports he took medication at the hospital prior to discharge. Pt reports he has used illegal substances in the past but reports not today. RN asked pt if he had any substances on his person and pt reported he 'hadn't had a chance to check but I think I have some cigarettes'. Pt getting sleepier while RN talking with him.

## 2022-11-15 NOTE — DISCHARGE INSTRUCTIONS
Follow-up with your doctor tomorrow.  Has somebody watch you at all times at night.  Return for recurrence of symptoms fever loss of bladder bowel control chest pain neck arm jaw pain altered mental status shortness of breath change her mind about staying in the hospital or any other new or worsening problems or concerns return immediately to the ER.  Do not take Valium do not combine your pain medication with any other muscle relaxer such as Robaxin Valium or any other narcotic.
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
## 2022-11-16 LAB — QT INTERVAL: 302 MS

## 2022-11-21 NOTE — TELEPHONE ENCOUNTER
Patient recently had surgery with [facility]. (Per [facility] ER note and [facility] care everywhere) No need for new MRI. Patient should follow-up with him Dr. Cooney.

## 2022-12-05 ENCOUNTER — APPOINTMENT (OUTPATIENT)
Dept: MRI IMAGING | Facility: HOSPITAL | Age: 47
End: 2022-12-05